# Patient Record
Sex: MALE | Race: WHITE | NOT HISPANIC OR LATINO | Employment: FULL TIME | URBAN - METROPOLITAN AREA
[De-identification: names, ages, dates, MRNs, and addresses within clinical notes are randomized per-mention and may not be internally consistent; named-entity substitution may affect disease eponyms.]

---

## 2023-01-11 LAB
ALBUMIN: 4.6 G/DL (ref 3.4–5)
ALP BLD-CCNC: 58 U/L (ref 33–120)
ALT SERPL-CCNC: 23 U/L (ref 10–52)
ANION GAP SERPL CALCULATED.3IONS-SCNC: 12 MMOL/L (ref 10–20)
AST SERPL-CCNC: 18 U/L (ref 9–39)
BASOPHILS # BLD: 0.04 X10E9/L (ref 0–0.1)
BASOPHILS RELATIVE PERCENT: 0.3 % (ref 0–2)
BICARBONATE: 31 MMOL/L (ref 21–32)
BILIRUB SERPL-MCNC: 0.5 MG/DL (ref 0–1.2)
C-REACTIVE PROTEIN: 0.11 MG/DL
CALCIUM SERPL-MCNC: 10.2 MG/DL (ref 8.6–10.3)
CHLORIDE BLD-SCNC: 97 MMOL/L (ref 98–107)
CREAT SERPL-MCNC: 0.71 MG/DL (ref 0.5–1.3)
EGFR MALE: >90 ML/MIN/1.73M2
EOSINOPHIL # BLD: 0.02 X10E9/L (ref 0–0.7)
EOSINOPHILS RELATIVE PERCENT: 0.2 % (ref 0–6)
ERYTHROCYTE [DISTWIDTH] IN BLOOD BY AUTOMATED COUNT: 12.4 % (ref 11.5–14)
GLUCOSE: 105 MG/DL (ref 74–99)
HCT VFR BLD CALC: 49 % (ref 41–52)
HEMOGLOBIN: 15.7 G/DL (ref 13.5–17)
HEPATITIS B CORE TOTAL ANTIBODY: NONREACTIVE
IMMATURE GRANULOCYTES %: 0.5 % (ref 0–0.9)
LYMPHOCYTES # BLD: 11.2 % (ref 13–44)
LYMPHOCYTES RELATIVE PERCENT: 1.34 X10E9/L (ref 1.2–4.8)
MCHC RBC AUTO-ENTMCNC: 32 G/DL (ref 32–36)
MCV RBC AUTO: 90 FL (ref 80–100)
MONOCYTES # BLD: 0.74 X10E9/L (ref 0.1–1)
MONOCYTES RELATIVE PERCENT: 6.2 % (ref 2–10)
NEUTROPHILS RELATIVE PERCENT: 81.6 % (ref 40–80)
NEUTROPHILS: 9.74 X10E9/L (ref 1.2–7.7)
PLATELET # BLD: 377 X10E9/L (ref 150–450)
POTASSIUM SERPL-SCNC: 4.7 MMOL/L (ref 3.5–5.3)
RBC # BLD: 5.43 X10E12/L (ref 4.5–5.9)
SEDIMENTATION RATE, ERYTHROCYTE: 4 MM/H (ref 0–15)
SODIUM BLD-SCNC: 135 MMOL/L (ref 136–145)
SPECIMEN SOURCE: NORMAL
TOTAL PROTEIN: 7.8 G/DL (ref 6.4–8.2)
UREA NITROGEN: 13 MG/DL (ref 6–23)
WBC: 11.9 X10E9/L (ref 4.4–11.3)

## 2023-01-14 LAB
NIL (NEGATIVE) SPOT CONTROL: NORMAL
PANEL A SPOT COUNT: 0
PANEL B SPOT COUNT: 0
POSITIVE CONTROL SPOT COUNT: NORMAL
T-SPOT TB INTERPRETATION: NEGATIVE

## 2024-04-18 ENCOUNTER — TELEPHONE (OUTPATIENT)
Dept: PRIMARY CARE | Facility: CLINIC | Age: 39
End: 2024-04-18

## 2024-04-18 NOTE — TELEPHONE ENCOUNTER
WIFE CALLS STATING PATIENT HAS WHITE PATCHES IN THROAT AND FEVER.  SON HAS STREP THROAT.  ADVISED URGENT CARE.   [facility]  PLEASE ADVISE.  LOV 1/3/23   APPEARS HE HAS A DIFFERENT DOCTOR BY CURRENT VISITS   No

## 2025-07-07 ENCOUNTER — HOSPITAL ENCOUNTER (OUTPATIENT)
Dept: RADIOLOGY | Facility: CLINIC | Age: 40
Discharge: HOME | End: 2025-07-07
Payer: COMMERCIAL

## 2025-07-07 ENCOUNTER — OFFICE VISIT (OUTPATIENT)
Dept: ORTHOPEDIC SURGERY | Facility: CLINIC | Age: 40
End: 2025-07-07
Payer: COMMERCIAL

## 2025-07-07 DIAGNOSIS — M25.562 ACUTE PAIN OF LEFT KNEE: ICD-10-CM

## 2025-07-07 DIAGNOSIS — M23.92 ACUTE INTERNAL DERANGEMENT OF LEFT KNEE: ICD-10-CM

## 2025-07-07 PROCEDURE — 73564 X-RAY EXAM KNEE 4 OR MORE: CPT | Mod: LEFT SIDE | Performed by: INTERNAL MEDICINE

## 2025-07-07 PROCEDURE — 73564 X-RAY EXAM KNEE 4 OR MORE: CPT | Mod: LT

## 2025-07-07 PROCEDURE — 99214 OFFICE O/P EST MOD 30 MIN: CPT | Performed by: INTERNAL MEDICINE

## 2025-07-07 PROCEDURE — 99202 OFFICE O/P NEW SF 15 MIN: CPT | Performed by: INTERNAL MEDICINE

## 2025-07-07 NOTE — PROGRESS NOTES
Acute Injury New Patient Visit    CC:   Chief Complaint   Patient presents with    Left Knee - Pain       HPI: Calvin is a 40 y.o. male presents today for evaluation for acute left knee injury sustained three days ago after twisting the left knee while playing volleyball. He notes left knee pain and intermittent instability. He is here for initial evaluation and x-rays.         Review of Systems   GENERAL: Negative for malaise, significant weight loss, fever  MUSCULOSKELETAL: See HPI  NEURO:  Negative for numbness / tingling     Past Medical History  Medical History[1]    Medication review  Medication Documentation Review Audit    **Prior to Admission medications have not yet been reviewed**         Allergies  RX Allergies[2]    Social History  Social History     Socioeconomic History    Marital status:      Spouse name: Not on file    Number of children: Not on file    Years of education: Not on file    Highest education level: Not on file   Occupational History    Not on file   Tobacco Use    Smoking status: Not on file    Smokeless tobacco: Not on file   Substance and Sexual Activity    Alcohol use: Not on file    Drug use: Not on file    Sexual activity: Not on file   Other Topics Concern    Not on file   Social History Narrative    Not on file     Social Drivers of Health     Financial Resource Strain: Low Risk  (12/12/2022)    Received from [facility]    Overall Financial Resource Strain (CARDIA)     Difficulty of Paying Living Expenses: Not hard at all   Food Insecurity: No Food Insecurity (3/21/2025)    Received from [facility]    Hunger Vital Sign     Worried About Running Out of Food in the Last Year: Never true     Ran Out of Food in the Last Year: Never true   Transportation Needs: No Transportation Needs (3/21/2025)    Received from [facility]    PRAPARE - Transportation     Lack of Transportation (Medical): No     Lack of Transportation (Non-Medical): No   Physical Activity:  Not on file   Stress: Not on file   Social Connections: Not on file   Intimate Partner Violence: Not on file   Housing Stability: Low Risk  (3/21/2025)    Received from [facility]    Housing Stability Vital Sign     Unable to Pay for Housing in the Last Year: No     Number of Times Moved in the Last Year: 0     Homeless in the Last Year: No       Surgical History  Surgical History[3]    Physical Exam:  GENERAL:  Patient is awake, alert, and oriented to person place and time.  Patient appears well nourished and well kept.  Affect Calm, Not Acutely Distressed.  HEENT:  Normocephalic, Atraumatic, EOMI  CARDIOVASCULAR:  Hemodynamically stable.  RESPIRATORY:  Normal respirations with unlabored breathing.  Extremity: Left knee examination shows skin is intact.  There is no erythema or warmth.  Trace amount of effusion.  Can flex the left knee to 130 degrees.  Full extension at 0 degrees.  No pain over the medial joint line.  Pain over the lateral joint line.  There is no pain over the patellar or quadricep tendon.  No pain over the proximal tibia.  No pain over the popliteal fossa.  Negative valgus stress test.  Negative varus stress test.  Negative Jeovanny's test medially with no instability.  Positive Jeovanny's test laterally with instability.  Negative Lachman's test.  Patellar and quadricep mechanism intact.  Negative anterior and posterior drawer test.  Negative patellar apprehension test.  Distal pulses are palpable, neurovascularly intact.  Walking with no significant antalgic gait.      Diagnostics: X-rays reviewed      Procedure: None    Assessment:   Left knee sprain   Left knee internal derangement    Plan: Calvin presents today for evaluation for acute left knee injury sustained three days ago while playing volleyball. X-rays were reviewed. We recommended MRI of the left knee for preoperative planning due to persistent pain with instability and mechanical symptoms. He will follow-up after the MRI of the  left knee, and discuss treatment options pending results.     Orders Placed This Encounter    XR knee left 4+ views      At the conclusion of the visit there were no further questions by the patient/family regarding their plan of care.  Patient was instructed to call or return with any issues, questions, or concerns regarding their injury and/or treatment plan described above.     07/07/25 at 3:54 PM - Jamie Hernandez MD  Scribe Attestation  By signing my name below, Stuart FRENCH, Scribe   attest that this documentation has been prepared under the direction and in the presence of Jamie Hernandez MD.    Office: [phone]    We already utilize the scribe attestation, Stuart FRENCH, am scribing for, and in the presence of Dr. Hernandez.    Jamie FRENCH MD personally performed the services described in the documentation as scribed by Stuart Condon in my presence, and confirm it is both accurate and complete.    This note was prepared using voice recognition software.  The details of this note are correct and have been reviewed, and corrected to the best of my ability.  Some grammatical errors may persist related to the Dragon software.         [1]   Past Medical History:  Diagnosis Date    Acute prostatitis 12/07/2022    Acute bacterial prostatitis    Personal history of other diseases of the digestive system 07/28/2022    History of rectal bleeding    Personal history of other diseases of urinary system 01/19/2022    History of urethritis   [2] Not on File  [3]   Past Surgical History:  Procedure Laterality Date    OTHER SURGICAL HISTORY  02/24/2021    Hernia repair